# Patient Record
Sex: MALE | Race: WHITE | NOT HISPANIC OR LATINO | Employment: FULL TIME | URBAN - METROPOLITAN AREA
[De-identification: names, ages, dates, MRNs, and addresses within clinical notes are randomized per-mention and may not be internally consistent; named-entity substitution may affect disease eponyms.]

---

## 2019-05-02 ENCOUNTER — HOSPITAL ENCOUNTER (OUTPATIENT)
Dept: CT IMAGING | Facility: HOSPITAL | Age: 54
Discharge: HOME | End: 2019-05-02
Attending: INTERNAL MEDICINE

## 2019-05-02 ENCOUNTER — OFFICE VISIT - HEALTHEAST (OUTPATIENT)
Dept: INTERNAL MEDICINE | Facility: CLINIC | Age: 54
End: 2019-05-02

## 2019-05-02 DIAGNOSIS — I65.23 BILATERAL CAROTID ARTERY STENOSIS: ICD-10-CM

## 2019-05-02 DIAGNOSIS — R91.8 PULMONARY NODULES: ICD-10-CM

## 2019-05-02 DIAGNOSIS — G43.109 MIGRAINE WITH AURA AND WITHOUT STATUS MIGRAINOSUS, NOT INTRACTABLE: ICD-10-CM

## 2019-05-02 DIAGNOSIS — G45.9 TIA (TRANSIENT ISCHEMIC ATTACK): ICD-10-CM

## 2019-05-02 DIAGNOSIS — R29.818 SUSPECTED SLEEP APNEA: ICD-10-CM

## 2019-05-02 DIAGNOSIS — R04.2 HEMOPTYSIS: ICD-10-CM

## 2019-05-02 LAB
CHOLEST SERPL-MCNC: 306 MG/DL
ERYTHROCYTE [SEDIMENTATION RATE] IN BLOOD BY WESTERGREN METHOD: 4 MM/HR (ref 0–15)
FASTING STATUS PATIENT QL REPORTED: YES
HDLC SERPL-MCNC: 58 MG/DL
LDLC SERPL CALC-MCNC: 222 MG/DL
TRIGL SERPL-MCNC: 128 MG/DL
TSH SERPL DL<=0.005 MIU/L-ACNC: 0.83 UIU/ML (ref 0.3–5)

## 2019-05-02 ASSESSMENT — MIFFLIN-ST. JEOR: SCORE: 1852.77

## 2019-05-16 ENCOUNTER — HOSPITAL ENCOUNTER (OUTPATIENT)
Dept: CARDIOLOGY | Facility: CLINIC | Age: 54
Discharge: HOME | End: 2019-05-16
Attending: INTERNAL MEDICINE

## 2019-05-16 DIAGNOSIS — G45.9 TIA (TRANSIENT ISCHEMIC ATTACK): ICD-10-CM

## 2019-05-16 LAB
AORTIC ROOT: 3.1 CM
ASCENDING AORTA: 3.1 CM
BSA FOR ECHO PROCEDURE: 2.23 M2
CV BLOOD PRESSURE: ABNORMAL MMHG
CV ECHO HEIGHT: 72 IN
CV ECHO WEIGHT: 216 LBS
DOP CALC LVOT AREA: 3.8 CM2
DOP CALC LVOT DIAMETER: 2.2 CM
DOP CALC LVOT PEAK VEL: 97.5 CM/S
DOP CALC LVOT STROKE VOLUME: 88.5 CM3
DOP CALCLVOT PEAK VEL VTI: 23.3 CM
EJECTION FRACTION: 64 % (ref 55–75)
FRACTIONAL SHORTENING: 45.1 % (ref 28–44)
INTERVENTRICULAR SEPTUM IN END DIASTOLE: 1.22 CM (ref 0.6–1)
IVS/PW RATIO: 0.9
LA AREA 1: 18.2 CM2
LA AREA 2: 15.5 CM2
LEFT ATRIUM LENGTH: 5.1 CM
LEFT ATRIUM SIZE: 3.8 CM
LEFT ATRIUM TO AORTIC ROOT RATIO: 1.23 NO UNITS
LEFT ATRIUM VOLUME INDEX: 21.1 ML/M2
LEFT ATRIUM VOLUME: 47 ML
LEFT VENTRICLE CARDIAC INDEX: 2.3 L/MIN/M2
LEFT VENTRICLE CARDIAC OUTPUT: 5.1 L/MIN
LEFT VENTRICLE DIASTOLIC VOLUME INDEX: 32.2 CM3/M2 (ref 34–74)
LEFT VENTRICLE DIASTOLIC VOLUME: 71.7 CM3 (ref 62–150)
LEFT VENTRICLE HEART RATE: 58 BPM
LEFT VENTRICLE MASS INDEX: 101.9 G/M2
LEFT VENTRICLE SYSTOLIC VOLUME INDEX: 11.6 CM3/M2 (ref 11–31)
LEFT VENTRICLE SYSTOLIC VOLUME: 25.8 CM3 (ref 21–61)
LEFT VENTRICULAR INTERNAL DIMENSION IN DIASTOLE: 4.68 CM (ref 4.2–5.8)
LEFT VENTRICULAR INTERNAL DIMENSION IN SYSTOLE: 2.57 CM (ref 2.5–4)
LEFT VENTRICULAR MASS: 227.2 G
LEFT VENTRICULAR OUTFLOW TRACT MEAN GRADIENT: 2 MMHG
LEFT VENTRICULAR OUTFLOW TRACT MEAN VELOCITY: 75.8 CM/S
LEFT VENTRICULAR OUTFLOW TRACT PEAK GRADIENT: 4 MMHG
LEFT VENTRICULAR POSTERIOR WALL IN END DIASTOLE: 1.31 CM (ref 0.6–1)
LV STROKE VOLUME INDEX: 39.7 ML/M2
MITRAL VALVE E/A RATIO: 1.2
MV AVERAGE E/E' RATIO: 9.2 CM/S
MV DECELERATION TIME: 218 MS
MV E'TISSUE VEL-LAT: 6.67 CM/S
MV E'TISSUE VEL-MED: 7.25 CM/S
MV LATERAL E/E' RATIO: 9.6
MV MEDIAL E/E' RATIO: 8.8
MV PEAK A VELOCITY: 52.9 CM/S
MV PEAK E VELOCITY: 64 CM/S
NUC REST DIASTOLIC VOLUME INDEX: 3456 LBS
NUC REST SYSTOLIC VOLUME INDEX: 72 IN
RIGHT VENTRICULAR INTERNAL DIMENSION IN DYSTOLE: 3.14 CM
TRICUSPID VALVE ANULAR PLANE SYSTOLIC EXCURSION: 2.6 CM

## 2019-05-16 ASSESSMENT — MIFFLIN-ST. JEOR: SCORE: 1852.77

## 2019-05-30 ENCOUNTER — HOSPITAL ENCOUNTER (OUTPATIENT)
Dept: CARDIOLOGY | Facility: CLINIC | Age: 54
Discharge: HOME | End: 2019-05-30
Attending: INTERNAL MEDICINE

## 2019-05-30 DIAGNOSIS — G45.9 TIA (TRANSIENT ISCHEMIC ATTACK): ICD-10-CM

## 2019-07-18 ENCOUNTER — RECORDS - HEALTHEAST (OUTPATIENT)
Dept: ADMINISTRATIVE | Facility: OTHER | Age: 54
End: 2019-07-18

## 2019-08-30 ENCOUNTER — RECORDS - HEALTHEAST (OUTPATIENT)
Dept: ADMINISTRATIVE | Facility: OTHER | Age: 54
End: 2019-08-30

## 2019-09-19 ENCOUNTER — RECORDS - HEALTHEAST (OUTPATIENT)
Dept: ADMINISTRATIVE | Facility: OTHER | Age: 54
End: 2019-09-19

## 2020-01-14 ENCOUNTER — RECORDS - HEALTHEAST (OUTPATIENT)
Dept: ADMINISTRATIVE | Facility: OTHER | Age: 55
End: 2020-01-14

## 2020-02-15 ENCOUNTER — COMMUNICATION - HEALTHEAST (OUTPATIENT)
Dept: INTERNAL MEDICINE | Facility: CLINIC | Age: 55
End: 2020-02-15

## 2020-05-04 ENCOUNTER — COMMUNICATION - HEALTHEAST (OUTPATIENT)
Dept: INTERNAL MEDICINE | Facility: CLINIC | Age: 55
End: 2020-05-04

## 2020-05-04 DIAGNOSIS — G45.9 TIA (TRANSIENT ISCHEMIC ATTACK): ICD-10-CM

## 2021-03-01 ENCOUNTER — OFFICE VISIT - HEALTHEAST (OUTPATIENT)
Dept: INTERNAL MEDICINE | Facility: CLINIC | Age: 56
End: 2021-03-01

## 2021-03-01 DIAGNOSIS — K80.20 GALLSTONES: ICD-10-CM

## 2021-03-01 DIAGNOSIS — G45.9 TIA (TRANSIENT ISCHEMIC ATTACK): ICD-10-CM

## 2021-03-01 DIAGNOSIS — E78.00 PURE HYPERCHOLESTEROLEMIA: ICD-10-CM

## 2021-03-01 DIAGNOSIS — Z80.0 FAMILY HISTORY OF COLON CANCER: ICD-10-CM

## 2021-03-01 DIAGNOSIS — Z13.1 SCREENING FOR DIABETES MELLITUS: ICD-10-CM

## 2021-03-01 DIAGNOSIS — Z00.00 ROUTINE GENERAL MEDICAL EXAMINATION AT A HEALTH CARE FACILITY: ICD-10-CM

## 2021-03-01 DIAGNOSIS — Z12.5 SCREENING FOR PROSTATE CANCER: ICD-10-CM

## 2021-03-01 DIAGNOSIS — R91.8 PULMONARY NODULES: ICD-10-CM

## 2021-03-01 DIAGNOSIS — K59.01 SLOW TRANSIT CONSTIPATION: ICD-10-CM

## 2021-03-01 DIAGNOSIS — Z86.0100 HISTORY OF COLONIC POLYPS: ICD-10-CM

## 2021-03-01 LAB
ALBUMIN SERPL-MCNC: 3.4 G/DL (ref 3.5–5)
ALP SERPL-CCNC: 91 U/L (ref 45–120)
ALT SERPL W P-5'-P-CCNC: 82 U/L (ref 0–45)
ANION GAP SERPL CALCULATED.3IONS-SCNC: 8 MMOL/L (ref 5–18)
AST SERPL W P-5'-P-CCNC: 53 U/L (ref 0–40)
BILIRUB SERPL-MCNC: 0.8 MG/DL (ref 0–1)
BUN SERPL-MCNC: 14 MG/DL (ref 8–22)
CALCIUM SERPL-MCNC: 9.1 MG/DL (ref 8.5–10.5)
CHLORIDE BLD-SCNC: 108 MMOL/L (ref 98–107)
CHOLEST SERPL-MCNC: 179 MG/DL
CO2 SERPL-SCNC: 28 MMOL/L (ref 22–31)
CREAT SERPL-MCNC: 1.22 MG/DL (ref 0.7–1.3)
ERYTHROCYTE [DISTWIDTH] IN BLOOD BY AUTOMATED COUNT: 13.5 % (ref 11–14.5)
FASTING STATUS PATIENT QL REPORTED: YES
GFR SERPL CREATININE-BSD FRML MDRD: >60 ML/MIN/1.73M2
GLUCOSE BLD-MCNC: 93 MG/DL (ref 70–125)
HBA1C MFR BLD: 5.4 %
HCT VFR BLD AUTO: 49.3 % (ref 40–54)
HDLC SERPL-MCNC: 53 MG/DL
HGB BLD-MCNC: 16.6 G/DL (ref 14–18)
LDLC SERPL CALC-MCNC: 99 MG/DL
MCH RBC QN AUTO: 29.5 PG (ref 27–34)
MCHC RBC AUTO-ENTMCNC: 33.7 G/DL (ref 32–36)
MCV RBC AUTO: 88 FL (ref 80–100)
PLATELET # BLD AUTO: 208 THOU/UL (ref 140–440)
PMV BLD AUTO: 11.8 FL (ref 7–10)
POTASSIUM BLD-SCNC: 4.4 MMOL/L (ref 3.5–5)
PROT SERPL-MCNC: 6.6 G/DL (ref 6–8)
PSA SERPL-MCNC: 1.1 NG/ML (ref 0–3.5)
RBC # BLD AUTO: 5.63 MILL/UL (ref 4.4–6.2)
SODIUM SERPL-SCNC: 144 MMOL/L (ref 136–145)
TRIGL SERPL-MCNC: 135 MG/DL
TSH SERPL DL<=0.005 MIU/L-ACNC: 1.03 UIU/ML (ref 0.3–5)
WBC: 7.2 THOU/UL (ref 4–11)

## 2021-03-01 RX ORDER — ROSUVASTATIN CALCIUM 20 MG/1
TABLET, COATED ORAL
Qty: 90 TABLET | Refills: 3 | Status: SHIPPED | OUTPATIENT
Start: 2021-03-01 | End: 2021-08-03

## 2021-03-01 ASSESSMENT — MIFFLIN-ST. JEOR: SCORE: 1880.84

## 2021-03-02 ENCOUNTER — COMMUNICATION - HEALTHEAST (OUTPATIENT)
Dept: INTERNAL MEDICINE | Facility: CLINIC | Age: 56
End: 2021-03-02

## 2021-03-02 DIAGNOSIS — R74.01 TRANSAMINITIS: ICD-10-CM

## 2021-03-25 ENCOUNTER — HOSPITAL ENCOUNTER (OUTPATIENT)
Dept: CT IMAGING | Facility: CLINIC | Age: 56
Discharge: HOME | End: 2021-03-25
Attending: INTERNAL MEDICINE

## 2021-03-25 DIAGNOSIS — R91.8 PULMONARY NODULES: ICD-10-CM

## 2021-05-28 NOTE — PROGRESS NOTES
Office Visit - New Patient   Jason Romano   53 y.o.  male    Date of visit: 5/2/2019  Physician: Anjum Jhon MD     Assessment and Plan   1. TIA (transient ischemic attack)  Probable TIA versus complex migraine.  Story is interesting and raises concern for paradoxical embolus.  Given his hemoptysis and mild dyspnea we will obtain a CT PE study.  We will also obtain an echocardiogram with bubble study.  His history of snoring and suspected sleep apnea raises concern for atrial fibrillation will conduct an event monitor.  He will continue with aspirin and resume rosuvastatin.  I have asked him to see neurology as well as sleep medicine.  - Echo With Bubble Study; Future  - AMAIRANI Hook-Up; Future  - Ambulatory referral to Neurology  - Lipid Cascade FASTING  - Erythrocyte Sedimentation Rate  - rosuvastatin (CRESTOR) 20 MG tablet; Take 1 tablet (20 mg total) by mouth at bedtime.  Dispense: 90 tablet; Refill: 3  - Thyroid [place]    2. Suspected sleep apnea  - Ambulatory referral to Sleep Medicine    3. Hemoptysis  - CTA Chest PE Run; Future    4. Pulmonary nodules  Very small in 2015.  Repeating imaging as above    5. Migraine with aura and without status migrainosus, not intractable  As above    Return in about 1 month (around 5/30/2019) for recheck.     Chief Complaint   Chief Complaint   Patient presents with     [facility]     Hospital Visit Follow Up        Patient Profile   Social History     Social History Narrative    Lives with his wife, Lauren.  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          Past Medical History   Patient Active Problem List   Diagnosis     Hyperlipidemia       Past Surgical History  He has a past surgical history that includes Vasectomy.     History of Present Illness   This 53 y.o. old man comes in to [facility] and for post emergency room follow-up.  His medical history was reviewed, electronic medical records obtained reflectance note.  Overall he is quite  healthy.  He has a history of migraine headaches, mainly ocular and occurring with less frequency.  He also has a history of hyperlipidemia and just before his 50th birthday had a CT calcium score which was 0.  At that time he was on a statin but this was discontinued because of his low calcium score.  He did have a calcified granuloma in his right lower lobe and a noncalcified 3 mm nodule in his left lower lobe.  He is a never smoker.  On 4/14/2019 he was at home when he sneezed.  He immediately had onset of right arm weakness.  He then coughed and had a small amount of hemoptysis.  He notified his wife and he came to the emergency room at [facility].  While speaking with the  he had word finding difficulty.  He had a CTA of the head and neck which showed some carotid artery disease but no evidence of stroke.  He then underwent an MRI MRA of the head and neck which again showed carotid artery disease but no evidence of acute stroke.  Symptoms resolved and it was felt that he likely had a complex migraine headache.  He did have an EKG which showed sinus rhythm with a right bundle branch block and left axis deviation.  He has had no recurrence of symptoms.  He did restart taking aspirin.  He has not restarted a statin.  He does snore and suspects he has sleep apnea.  His father is atrial fibrillation.  He has no history of blood clots.  His mother did have a DVT when she was younger.  He felt mildly short of breath with exertion but is felt that this was due to deconditioning.  He has not really had any pleuritic chest pain.  He has had no leg swelling.  No recent travel.    Review of Systems: A comprehensive review of systems was negative except as noted.     Medications and Allergies   Current Outpatient Medications   Medication Sig Dispense Refill     acetaminophen (TYLENOL) 500 MG tablet Take 1,000 mg by mouth every 6 (six) hours as needed for pain.       ascorbic acid, vitamin C, (VITAMIN C)  1000 MG tablet Take 1,000 mg by mouth daily.       aspirin 81 MG EC tablet Take 81 mg by mouth daily.       cholecalciferol, vitamin D3, 1,000 unit tablet Take 4,000 Units by mouth daily.       naproxen sodium (ALEVE) 220 MG tablet Take 220 mg by mouth 2 (two) times a day as needed.              rosuvastatin (CRESTOR) 20 MG tablet Take 1 tablet (20 mg total) by mouth at bedtime. 90 tablet 3     No current facility-administered medications for this visit.      Allergies   Allergen Reactions     Atorvastatin      Muscle aches        Family and Social History   Family History   Problem Relation Age of Onset     Deep vein thrombosis Mother      Atrial fibrillation Father      Pacemaker Father      Colon cancer Father      Melanoma Father      No Medical Problems Sister      No Medical Problems Son      No Medical Problems Son      No Medical Problems Son      No Medical Problems Daughter      No Medical Problems Daughter         Social History     Tobacco Use     Smoking status: Never Smoker     Smokeless tobacco: Never Used   Substance Use Topics     Alcohol use: Yes     Comment: rare     Drug use: Never        Physical Exam   General Appearance:   No acute distress    /82 (Patient Site: Right Arm, Patient Position: Sitting, Cuff Size: Adult Regular)   Pulse 71   Ht 6' (1.829 m)   Wt 216 lb (98 kg)   SpO2 98%   BMI 29.29 kg/m      EYES: Eyelids, conjunctiva, and sclera were normal. Pupils were normal. Cornea, iris, and lens were normal bilaterally.  HEAD, EARS, NOSE, MOUTH, AND THROAT: Head and face were normal. Hearing was normal to voice and the ears were normal to external exam. Nose appearance was normal and there was no discharge. Oropharynx was normal.  NECK: Neck appearance was normal. There were no neck masses and the thyroid was not enlarged.  RESPIRATORY: Breathing pattern was normal and the chest moved symmetrically.  Percussion/auscultatory percussion was normal.  Lung sounds were normal and  there were no abnormal sounds.  CARDIOVASCULAR: Heart rate and rhythm were normal.  S1 and S2 were normal and there were no extra sounds or murmurs. Peripheral pulses in arms and legs were normal.  Jugular venous pressure was normal.  There was no peripheral edema.  GASTROINTESTINAL: The abdomen was normal in contour.  Bowel sounds were present.  Percussion detected no organ enlargement or tenderness.  Palpation detected no tenderness, mass, or enlarged organs.   MUSCULOSKELETAL: Skeletal configuration was normal and muscle mass was normal for age. Joint appearance was overall normal.  LYMPHATIC: There were no enlarged nodes.  SKIN/HAIR/NAILS: Skin color was normal.  There were no skin lesions.  Hair and nails were normal.  NEUROLOGIC: The patient was alert and oriented to person, place, time, and circumstance. Speech was normal. Cranial nerves were normal. Motor strength was normal for age. The patient was normally coordinated.  PSYCHIATRIC:  Mood and affect were normal and the patient had normal recent and remote memory. The patient's judgment and insight were normal.       Additional Information   Review and/or order of clinical lab tests: Reviewed and ordered  Review and/or order of radiology tests: Reviewed and ordered  Review and/or order of medicine tests: Reviewed and ordered  Discussion of test results with performing physician:  Decision to obtain old records and/or obtain history from someone other than the patient:  Review and summarization of old records and/or obtaining history from someone other than the patient and.or discussion of case with another health care provider: I reviewed his emergency room visit summarized above  Independent visualization of image, tracing or specimen itself: I personally reviewed his EKG, summarized above    Time:      Anjum John MD  Internal Medicine  Contact me at [phone]

## 2021-06-02 VITALS — WEIGHT: 216 LBS | HEIGHT: 72 IN | BODY MASS INDEX: 29.26 KG/M2

## 2021-06-02 VITALS — BODY MASS INDEX: 29.26 KG/M2 | HEIGHT: 72 IN | WEIGHT: 216 LBS

## 2021-06-05 VITALS
HEART RATE: 78 BPM | DIASTOLIC BLOOD PRESSURE: 76 MMHG | BODY MASS INDEX: 30.09 KG/M2 | SYSTOLIC BLOOD PRESSURE: 116 MMHG | HEIGHT: 72 IN | OXYGEN SATURATION: 97 % | WEIGHT: 222.19 LBS

## 2021-06-06 NOTE — TELEPHONE ENCOUNTER
Isolated elbow pain would be very unusual side effect of rosuvastatin.  This is frequently related to a bursitis such as tennis elbow or golfer's elbow.  Arthritis of the elbow could cause similar symptoms.  To be sure it is not from rosuvastatin, you could stop the medication for 1 month.  Monitor the pain over the month and if it goes away then resume the medication and see if the pain comes back.  We can do all blood work at the time of your physical.

## 2021-06-07 NOTE — TELEPHONE ENCOUNTER
RN cannot approve Refill Request    RN can NOT refill this medication Protocol failed and NO refill given.     Anna Russell, [facility] Triage/Med Refill 5/5/2020    Requested Prescriptions   Pending Prescriptions Disp Refills     rosuvastatin (CRESTOR) 20 MG tablet [Pharmacy Med Name: ROSUVASTATIN CALCIUM 20 MG TAB] 90 tablet 3     Sig: TAKE 1 TABLET BY MOUTH EVERYDAY AT BEDTIME       Statins Refill Protocol (Hmg CoA Reductase Inhibitors) Failed - 5/4/2020 12:27 AM        Failed - PCP or prescribing provider visit in past 12 months      Last office visit with prescriber/PCP: 5/2/2019 Anjum John MD OR same dept: Visit date not found OR same specialty: 5/2/2019 Anjum John MD  Last physical: Visit date not found Last MTM visit: Visit date not found   Next visit within 3 mo: Visit date not found  Next physical within 3 mo: Visit date not found  Prescriber OR PCP: Anjum John MD  Last diagnosis associated with med order: 1. TIA (transient ischemic attack)  - rosuvastatin (CRESTOR) 20 MG tablet [Pharmacy Med Name: ROSUVASTATIN CALCIUM 20 MG TAB]; TAKE 1 TABLET BY MOUTH EVERYDAY AT BEDTIME  Dispense: 90 tablet; Refill: 3    If protocol passes may refill for 12 months if within 3 months of last provider visit (or a total of 15 months).

## 2021-06-15 NOTE — PROGRESS NOTES
Office Visit - Physical   Jason Romano   55 y.o.  male    Date of visit: 3/1/2021  Physician: Anjum John MD     Assessment and Plan   1. Routine general medical examination at a health care facility  This is a generally healthy 55-year-old man with issues as discussed below.  He deferred on vaccinations given recent Covid vaccine and we can discuss again in the future.    2. TIA (transient ischemic attack)  Likely complex migraine although TIA is a consideration.  He is now on aspirin and statin and his neurological evaluation was unremarkable.  - rosuvastatin (CRESTOR) 20 MG tablet; TAKE 1 TABLET BY MOUTH EVERYDAY AT BEDTIME  Dispense: 90 tablet; Refill: 3    3. Pure hypercholesterolemia  Continue rosuvastatin which she is tolerating  - HM2(CBC w/o Differential)  - Lipid Cascade  - Comprehensive Metabolic Panel  - Thyroid Cascade    4. History of colonic polyps  5. Family history of colon cancer  Follow-up colonoscopy, 1 year given advanced polyp    6. Pulmonary nodules  - CT Chest Without Contrast; Future    7. Slow transit constipation  Discussed fiber and MiraLAX    8. Gallstones  Not appear symptomatic at this time    9. Screening for diabetes mellitus  - Glycosylated Hemoglobin A1c    10. Screening for prostate cancer  - PSA (Prostatic-Specific Antigen), Annual Screen    Return in about 1 year (around 3/1/2022) for annual physical.     Chief Complaint   Chief Complaint   Patient presents with     Annual Exam     fasting        Patient Profile   Social History     Social History Narrative    Lives with his wife, Lauren.  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          Past Medical History   Patient Active Problem List   Diagnosis     Pure hypercholesterolemia       Past Surgical History  He has a past surgical history that includes Vasectomy.     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  Overall doing quite well.  When I last saw him  he had recently had a neurological event.  We evaluated him for possible TIA and this was unremarkable.  He was having some pulmonary symptoms and small amount of hemoptysis and we did a CT PE study which looked okay with the exception of the pulmonary nodule which was new from a CT calcium scan in 2015.  His cholesterol is also quite elevated and despite a low CT calcium score of 0 we decided to start rosuvastatin.  He is also taking aspirin.  No further neurological events.  He does have a family history of colon cancer in his father and had an advanced polyp removed recently.  He is to go in for 1 year follow-up.  He has been having some issues with constipation which is long-term.  No significant upper GI symptoms.  Occasional reflux but no dyspepsia nausea or vomiting.  Had some gallstones on the CT scan but no postprandial right upper quadrant pain.    Review of Systems: A comprehensive review of systems was negative except as noted.     Medications and Allergies   Current Outpatient Medications   Medication Sig Dispense Refill     acetaminophen (TYLENOL) 500 MG tablet Take 1,000 mg by mouth every 6 (six) hours as needed for pain.       ascorbic acid, vitamin C, (VITAMIN C) 1000 MG tablet Take 1,000 mg by mouth daily.       aspirin 81 MG EC tablet Take 81 mg by mouth daily.       cholecalciferol, vitamin D3, 1,000 unit tablet Take 4,000 Units by mouth daily.       naproxen sodium (ALEVE) 220 MG tablet Take 220 mg by mouth 2 (two) times a day as needed.              rosuvastatin (CRESTOR) 20 MG tablet TAKE 1 TABLET BY MOUTH EVERYDAY AT BEDTIME 90 tablet 3     No current facility-administered medications for this visit.      Allergies   Allergen Reactions     Atorvastatin      Muscle aches        Family and Social History   Family History   Problem Relation Age of Onset     Deep vein thrombosis Mother      Atrial fibrillation Father      Pacemaker Father      Colon cancer Father 50     Melanoma Father       Hypertension Sister      No Medical Problems Son         Research     No Medical Problems Son         Undergrad [employer]     No Medical Problems Son         HS      No Medical Problems Daughter         NP      No Medical Problems Daughter         [employer] [place]        Social History     Tobacco Use     Smoking status: Never Smoker     Smokeless tobacco: Never Used   Substance Use Topics     Alcohol use: Yes     Comment: rare     Drug use: Never        Physical Exam   General Appearance:   No acute distress    /76 (Patient Site: Left Arm, Patient Position: Sitting, Cuff Size: Adult Regular)   Pulse 78   Ht 6' (1.829 m)   Wt (!) 222 lb 3 oz (100.8 kg)   SpO2 97%   BMI 30.13 kg/m      EYES: Eyelids, conjunctiva, and sclera were normal. Pupils were normal. Cornea, iris, and lens were normal bilaterally.  HEAD, EARS, NOSE, MOUTH, AND THROAT: Head and face were normal. Hearing was normal to voice and the ears were normal to external exam. Nose appearance was normal and there was no discharge. Oropharynx was normal.  NECK: Neck appearance was normal. There were no neck masses and the thyroid was not enlarged.  RESPIRATORY: Breathing pattern was normal and the chest moved symmetrically.  Percussion/auscultatory percussion was normal.  Lung sounds were normal and there were no abnormal sounds.  CARDIOVASCULAR: Heart rate and rhythm were normal.  S1 and S2 were normal and there were no extra sounds or murmurs. Peripheral pulses in arms and legs were normal.  Jugular venous pressure was normal.  There was no peripheral edema.  GASTROINTESTINAL: The abdomen was normal in contour.  Bowel sounds were present.  Percussion detected no organ enlargement or tenderness.  Palpation detected no tenderness, mass, or enlarged organs.   MUSCULOSKELETAL: Skeletal configuration was normal and muscle mass was normal for age. Joint appearance was overall normal.  LYMPHATIC: There were no enlarged nodes.  SKIN/HAIR/NAILS:  Skin color was normal.  There were no skin lesions.  Hair and nails were normal.  NEUROLOGIC: The patient was alert and oriented to person, place, time, and circumstance. Speech was normal. Cranial nerves were normal. Motor strength was normal for age. The patient was normally coordinated.  PSYCHIATRIC:  Mood and affect were normal and the patient had normal recent and remote memory. The patient's judgment and insight were normal.       Additional Information        Anjum John MD  Internal Medicine  Contact me at [phone]

## 2021-06-17 ENCOUNTER — AMBULATORY - HEALTHEAST (OUTPATIENT)
Dept: LAB | Facility: CLINIC | Age: 56
End: 2021-06-17

## 2021-06-17 DIAGNOSIS — R74.01 TRANSAMINITIS: ICD-10-CM

## 2021-06-17 LAB
ALBUMIN SERPL-MCNC: 3.4 G/DL (ref 3.5–5)
ALP SERPL-CCNC: 66 U/L (ref 45–120)
ALT SERPL W P-5'-P-CCNC: 37 U/L (ref 0–45)
ANION GAP SERPL CALCULATED.3IONS-SCNC: 8 MMOL/L (ref 5–18)
AST SERPL W P-5'-P-CCNC: 28 U/L (ref 0–40)
BILIRUB SERPL-MCNC: 1.1 MG/DL (ref 0–1)
BUN SERPL-MCNC: 19 MG/DL (ref 8–22)
CALCIUM SERPL-MCNC: 8.8 MG/DL (ref 8.5–10.5)
CHLORIDE BLD-SCNC: 110 MMOL/L (ref 98–107)
CO2 SERPL-SCNC: 25 MMOL/L (ref 22–31)
CREAT SERPL-MCNC: 1.19 MG/DL (ref 0.7–1.3)
GFR SERPL CREATININE-BSD FRML MDRD: >60 ML/MIN/1.73M2
GLUCOSE BLD-MCNC: 93 MG/DL (ref 70–125)
POTASSIUM BLD-SCNC: 3.8 MMOL/L (ref 3.5–5)
PROT SERPL-MCNC: 6.6 G/DL (ref 6–8)
SODIUM SERPL-SCNC: 143 MMOL/L (ref 136–145)

## 2021-06-26 ENCOUNTER — HEALTH MAINTENANCE LETTER (OUTPATIENT)
Age: 56
End: 2021-06-26

## 2021-08-03 DIAGNOSIS — G45.9 TIA (TRANSIENT ISCHEMIC ATTACK): ICD-10-CM

## 2021-08-03 RX ORDER — ROSUVASTATIN CALCIUM 20 MG/1
TABLET, COATED ORAL
Qty: 90 TABLET | Refills: 3 | Status: SHIPPED | OUTPATIENT
Start: 2021-08-03 | End: 2022-05-26

## 2021-10-16 ENCOUNTER — HEALTH MAINTENANCE LETTER (OUTPATIENT)
Age: 56
End: 2021-10-16

## 2022-05-22 ENCOUNTER — HEALTH MAINTENANCE LETTER (OUTPATIENT)
Age: 57
End: 2022-05-22

## 2022-05-23 ASSESSMENT — ENCOUNTER SYMPTOMS
ARTHRALGIAS: 0
SHORTNESS OF BREATH: 0
HEMATOCHEZIA: 0
FEVER: 0
NERVOUS/ANXIOUS: 0
SORE THROAT: 0
HEADACHES: 0
FREQUENCY: 0
DIZZINESS: 0
ABDOMINAL PAIN: 1
PALPITATIONS: 0
JOINT SWELLING: 0
CHILLS: 0
MYALGIAS: 0
NAUSEA: 0
CONSTIPATION: 1
DYSURIA: 0
EYE PAIN: 0
COUGH: 0
WEAKNESS: 0
DIARRHEA: 0
PARESTHESIAS: 0
HEARTBURN: 0
HEMATURIA: 0

## 2022-05-26 ENCOUNTER — OFFICE VISIT (OUTPATIENT)
Dept: INTERNAL MEDICINE | Facility: CLINIC | Age: 57
End: 2022-05-26
Payer: COMMERCIAL

## 2022-05-26 VITALS
HEART RATE: 70 BPM | HEIGHT: 72 IN | WEIGHT: 222.3 LBS | BODY MASS INDEX: 30.11 KG/M2 | OXYGEN SATURATION: 98 % | DIASTOLIC BLOOD PRESSURE: 96 MMHG | SYSTOLIC BLOOD PRESSURE: 154 MMHG

## 2022-05-26 DIAGNOSIS — E66.09 CLASS 1 OBESITY DUE TO EXCESS CALORIES WITHOUT SERIOUS COMORBIDITY WITH BODY MASS INDEX (BMI) OF 30.0 TO 30.9 IN ADULT: ICD-10-CM

## 2022-05-26 DIAGNOSIS — E66.811 CLASS 1 OBESITY DUE TO EXCESS CALORIES WITHOUT SERIOUS COMORBIDITY WITH BODY MASS INDEX (BMI) OF 30.0 TO 30.9 IN ADULT: ICD-10-CM

## 2022-05-26 DIAGNOSIS — R53.82 CHRONIC FATIGUE: ICD-10-CM

## 2022-05-26 DIAGNOSIS — Z13.1 SCREENING FOR DIABETES MELLITUS: ICD-10-CM

## 2022-05-26 DIAGNOSIS — I65.23 CAROTID ATHEROSCLEROSIS, BILATERAL: ICD-10-CM

## 2022-05-26 DIAGNOSIS — Z00.00 ROUTINE GENERAL MEDICAL EXAMINATION AT A HEALTH CARE FACILITY: Primary | ICD-10-CM

## 2022-05-26 DIAGNOSIS — Z80.0 FAMILY HISTORY OF COLON CANCER: ICD-10-CM

## 2022-05-26 DIAGNOSIS — R68.82 LOW LIBIDO: ICD-10-CM

## 2022-05-26 DIAGNOSIS — Z86.16 HISTORY OF COVID-19: ICD-10-CM

## 2022-05-26 DIAGNOSIS — E78.00 PURE HYPERCHOLESTEROLEMIA: ICD-10-CM

## 2022-05-26 DIAGNOSIS — K80.20 GALLSTONES: ICD-10-CM

## 2022-05-26 DIAGNOSIS — Z12.5 SCREENING PSA (PROSTATE SPECIFIC ANTIGEN): ICD-10-CM

## 2022-05-26 PROBLEM — K63.5 COLON POLYPS: Status: ACTIVE | Noted: 2022-05-26

## 2022-05-26 PROCEDURE — 99396 PREV VISIT EST AGE 40-64: CPT | Performed by: INTERNAL MEDICINE

## 2022-05-26 ASSESSMENT — ENCOUNTER SYMPTOMS
JOINT SWELLING: 0
CHILLS: 0
HEADACHES: 0
HEMATURIA: 0
FEVER: 0
NERVOUS/ANXIOUS: 0
CONSTIPATION: 1
HEMATOCHEZIA: 0
EYE PAIN: 0
WEAKNESS: 0
ARTHRALGIAS: 0
COUGH: 0
FREQUENCY: 0
PARESTHESIAS: 0
DIZZINESS: 0
HEARTBURN: 0
ABDOMINAL PAIN: 1
PALPITATIONS: 0
DIARRHEA: 0
NAUSEA: 0
DYSURIA: 0
MYALGIAS: 0
SHORTNESS OF BREATH: 0
SORE THROAT: 0

## 2022-05-26 NOTE — PROGRESS NOTES
SUBJECTIVE:   CC: Jason Romano is an 56 year old male who presents for preventative health visit.       Patient has been advised of split billing requirements and indicates understanding: Yes  Healthy Habits:     Getting at least 3 servings of Calcium per day:  Yes    Bi-annual eye exam:  Yes    Dental care twice a year:  NO    Sleep apnea or symptoms of sleep apnea:  Sleep apnea    Diet:  Regular (no restrictions)    Frequency of exercise:  1 day/week    Duration of exercise:  Less than 15 minutes    Taking medications regularly:  Yes    Medication side effects:  Muscle aches    PHQ-2 Total Score: 0    Additional concerns today:  No    Today's PHQ-2 Score:   PHQ-2 ( 1999 Pfizer) 5/23/2022   Q1: Little interest or pleasure in doing things 0   Q2: Feeling down, depressed or hopeless 0   PHQ-2 Score 0   Q1: Little interest or pleasure in doing things Not at all   Q2: Feeling down, depressed or hopeless Not at all   PHQ-2 Score 0       Abuse: Current or Past(Physical, Sexual or Emotional)- No  Do you feel safe in your environment? Yes    Have you ever done Advance Care Planning? (For example, a Health Directive, POLST, or a discussion with a medical provider or your loved ones about your wishes): No, advance care planning information given to patient to review.  Patient plans to discuss their wishes with loved ones or provider.      Social History     Tobacco Use     Smoking status: Never Smoker     Smokeless tobacco: Never Used   Substance Use Topics     Alcohol use: Yes     Comment: Alcoholic Drinks/day: rare     If you drink alcohol do you typically have >3 drinks per day or >7 drinks per week? No    Alcohol Use 5/26/2022   Prescreen: >3 drinks/day or >7 drinks/week? -   Prescreen: >3 drinks/day or >7 drinks/week? No       Last PSA  Prostate Specific Antigen Screen   Date Value Ref Range Status   03/01/2021 1.1 0.0 - 3.5 ng/mL Final       Reviewed orders with patient. Reviewed health maintenance and updated orders  accordingly - Yes      Reviewed and updated as needed this visit by clinical staff   Tobacco  Allergies  Meds                Reviewed and updated as needed this visit by Provider     Meds                 Review of Systems   Constitutional: Negative for chills and fever.   HENT: Negative for congestion, ear pain, hearing loss and sore throat.    Eyes: Negative for pain and visual disturbance.   Respiratory: Negative for cough and shortness of breath.    Cardiovascular: Negative for chest pain, palpitations and peripheral edema.   Gastrointestinal: Positive for abdominal pain and constipation. Negative for diarrhea, heartburn, hematochezia and nausea.   Genitourinary: Negative for dysuria, frequency, genital sores, hematuria, impotence, penile discharge and urgency.   Musculoskeletal: Negative for arthralgias, joint swelling and myalgias.   Skin: Negative for rash.   Neurological: Negative for dizziness, weakness, headaches and paresthesias.   Psychiatric/Behavioral: Negative for mood changes. The patient is not nervous/anxious.        OBJECTIVE:   BP (!) 146/93   Pulse 70   Ht 1.829 m (6')   Wt 100.8 kg (222 lb 4.8 oz)   SpO2 98%   BMI 30.15 kg/m      Physical Exam      General: Alert, in no distress  Skin: No significant lesion seen.  Eyes/nose/throat: Eyes without scleral icterus, eye movements normal, pupils equal and reactive, oropharynx clear, ears with normal TM's  MSK: Neck with good ROM  Lymphatic: Neck without adenopathy or masses  Endocrine: Thyroid with no nodules to palpation  Pulm: Lungs clear to auscultation bilaterally  Cardiac: Heart with regular rate and rhythm, no murmur or gallop  No carotid bruit heard  GI: Abdomen soft, nontender. No palpable enlargement of liver or spleen  MSK: Extremities no tenderness or edema  Neuro: Moves all extremities, without focal weakness  Psych: Alert, normal mental status. Normal affect and speech      ASSESSMENT/PLAN:     Annual preventive exam for   Juan Antonio, who continues in his busy practice at [facility], and has generally been feeling well.  He knows he needs to lose some weight, and will be ramping up exercise.    He will come in for fasting blood test on future morning, at which time we will check his lipid panel, comprehensive metabolic panel, CBC, TSH, screening PSA, and also morning testosterone level and vitamin D level.    He has received 2 doses of COVID-19 vaccine and also recovered from a breakthrough infection.    Issues are as follows:    Pure hypercholesterolemia, with troublesome side effects from atorvastatin and rosuvastatin, no significant coronary calcifications, but does have carotid atherosclerosis (detailed below)  Initially atorvastatin, myalgias. Switched to rosuvastatin around 2010  Was on rosuvastatin 20 mg a day, on and off for most of [id number], dose reduced to 10 mg March 2021 until stopping early May 2022  Because of abdominal discomforts  Untreated  (5-2-2019)    3-1-2021 while on rosuvastatin 20 mg  LDL Cholesterol Calculated <=129 mg/dL      3-  CORONARY ARTERY CALCIFICATION: Nothing significant.    Blood pressure elevation recorded today May 20, 2022, will recheck, and also asked him to track his blood pressure outside the clinic.  Target 120/80  BP Readings from Last 6 Encounters:   05/26/22 (!) 146/93   03/01/21 116/76     History of TIA (transient ischemic attack) vs complex migraine, April 2019, there has been no recurrence  Spell of right upper extremity weakness  Likely complex migraine although TIA was a consideration  Continues on a baby aspirin daily    AST and ALT elevation, which subsequently normalized  Alcohol weekend a few beers  3-1-2021  AST 0 - 40 U/L 53 High       ALT 0 - 45 U/L 82 High      6-  AST 0 - 40 U/L 28      ALT 0 - 45 U/L 37      Carotid atherosclerotic plaque, but no history of cerebral ischemia  4-  RIGHT CAROTID: Mild atherosclerotic type luminal irregularity  at the right carotid bifurcation and within the right carotid bulb with less than 30% stenosis in the proximal right ICA based on NASCET criteria.  LEFT CAROTID: Mild irregularity of the proximal-mid left ICA segment with at most 30-40% stenosis in the left ICA based on NASCET criteria. No findings to suggest acute dissection.  IMPRESSION:  HEAD MRI:   1.  No mass, hemorrhage, or recent infarct identified.  HEAD MRA:   1.  No aneurysm, high flow AVM or significant stenosis identified.  NECK MRA:  1.  Mild atherosclerotic change at the carotid bifurcations and within the proximal-mid left ICA segment. There is 30-40% stenosis of the left ICA and less than 30% stenosis of the proximal right ICA by NASCET criteria.  2.  No evidence for acute dissection.    Overweight, BMI 30  Wt Readings from Last 5 Encounters:   05/26/22 100.8 kg (222 lb 4.8 oz)   03/01/21 100.8 kg (222 lb 3 oz)   05/02/19 98 kg (216 lb)   03/24/16 100 kg (220 lb 6.4 oz)   04/03/15 97.3 kg (214 lb 8 oz)     Positional sleep apnea  He is already undergone a sleep study, but does not need to use CPAP, and manages with sleeping position    History of advanced colonic polyps, next colonoscopy would be due December 2022  Family history of colon cancer (father)  Recent colonoscopy was December 2021, 2 polyps, small less than 10 mm  Colonoscopy before that was December 2020, which revealed 1 large adenomatous polyp  Dr. 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  Prior right lower lobe nodular focus no longer seen. Few stable incidental pulmonary lymph nodes up to 5 mm requiring no routine follow-up. Incidental old granulomatous change and calcifications. Regions of minimal bronchiectasis    Asymptomatic  gallstones  CT scan March 25, 2021  UPPER ABDOMEN: Cholelithiasis.    Slow transit constipation  Managed with fiber and MiraLAX  He believes rosuvastatin may have been a contributor as well    Prostate Specific Antigen Screen   Date Value Ref Range Status   03/01/2021 1.1 0.0 - 3.5 ng/mL Final     History of a mild case of COVID-19 in April 2021  Immunization History   Administered Date(s) Administered     COVID-19,PF,Pfizer (12+ Yrs) 12/30/2020, 01/19/2021     Td,adult,historic,unspecified 02/05/2003     Tdap (Adacel,Boostrix) 10/17/2013         COUNSELING:   Reviewed preventive health counseling, as reflected in patient instructions       Healthy diet/nutrition    Estimated body mass index is 30.15 kg/m  as calculated from the following:    Height as of this encounter: 1.829 m (6').    Weight as of this encounter: 100.8 kg (222 lb 4.8 oz).     Weight management plan: Discussed healthy diet and exercise guidelines    He reports that he has never smoked. He has never used smokeless tobacco.        RANDA BROOKE MD  [facility]

## 2022-06-03 ENCOUNTER — LAB (OUTPATIENT)
Dept: LAB | Facility: CLINIC | Age: 57
End: 2022-06-03
Payer: COMMERCIAL

## 2022-06-03 DIAGNOSIS — R68.82 LOW LIBIDO: ICD-10-CM

## 2022-06-03 DIAGNOSIS — Z13.1 SCREENING FOR DIABETES MELLITUS: ICD-10-CM

## 2022-06-03 DIAGNOSIS — Z12.5 SCREENING PSA (PROSTATE SPECIFIC ANTIGEN): ICD-10-CM

## 2022-06-03 DIAGNOSIS — R53.82 CHRONIC FATIGUE: ICD-10-CM

## 2022-06-03 DIAGNOSIS — E78.00 PURE HYPERCHOLESTEROLEMIA: ICD-10-CM

## 2022-06-03 DIAGNOSIS — Z00.00 ROUTINE GENERAL MEDICAL EXAMINATION AT A HEALTH CARE FACILITY: ICD-10-CM

## 2022-06-03 LAB
ALBUMIN SERPL-MCNC: 2.9 G/DL (ref 3.5–5)
ALP SERPL-CCNC: 74 U/L (ref 45–120)
ALT SERPL W P-5'-P-CCNC: 53 U/L (ref 0–45)
ANION GAP SERPL CALCULATED.3IONS-SCNC: 9 MMOL/L (ref 5–18)
AST SERPL W P-5'-P-CCNC: 30 U/L (ref 0–40)
BILIRUB SERPL-MCNC: 0.6 MG/DL (ref 0–1)
BUN SERPL-MCNC: 17 MG/DL (ref 8–22)
CALCIUM SERPL-MCNC: 9.2 MG/DL (ref 8.5–10.5)
CHLORIDE BLD-SCNC: 107 MMOL/L (ref 98–107)
CHOLEST SERPL-MCNC: 322 MG/DL
CO2 SERPL-SCNC: 27 MMOL/L (ref 22–31)
CREAT SERPL-MCNC: 1.13 MG/DL (ref 0.7–1.3)
DEPRECATED CALCIDIOL+CALCIFEROL SERPL-MC: 68 UG/L (ref 20–75)
ERYTHROCYTE [DISTWIDTH] IN BLOOD BY AUTOMATED COUNT: 13.4 % (ref 10–15)
FASTING STATUS PATIENT QL REPORTED: YES
GFR SERPL CREATININE-BSD FRML MDRD: 76 ML/MIN/1.73M2
GLUCOSE BLD-MCNC: 107 MG/DL (ref 70–125)
HBA1C MFR BLD: 5.4 % (ref 0–5.6)
HCT VFR BLD AUTO: 47 % (ref 40–53)
HDLC SERPL-MCNC: 53 MG/DL
HGB BLD-MCNC: 16.2 G/DL (ref 13.3–17.7)
LDLC SERPL CALC-MCNC: 236 MG/DL
MCH RBC QN AUTO: 30.5 PG (ref 26.5–33)
MCHC RBC AUTO-ENTMCNC: 34.5 G/DL (ref 31.5–36.5)
MCV RBC AUTO: 89 FL (ref 78–100)
PLATELET # BLD AUTO: 222 10E3/UL (ref 150–450)
POTASSIUM BLD-SCNC: 4.6 MMOL/L (ref 3.5–5)
PROT SERPL-MCNC: 6.2 G/DL (ref 6–8)
RBC # BLD AUTO: 5.31 10E6/UL (ref 4.4–5.9)
SHBG SERPL-SCNC: 46 NMOL/L (ref 11–80)
SODIUM SERPL-SCNC: 143 MMOL/L (ref 136–145)
TRIGL SERPL-MCNC: 164 MG/DL
TSH SERPL DL<=0.005 MIU/L-ACNC: 1.51 UIU/ML (ref 0.3–5)
WBC # BLD AUTO: 6.6 10E3/UL (ref 4–11)

## 2022-06-03 PROCEDURE — 84443 ASSAY THYROID STIM HORMONE: CPT

## 2022-06-03 PROCEDURE — 36415 COLL VENOUS BLD VENIPUNCTURE: CPT

## 2022-06-03 PROCEDURE — 80061 LIPID PANEL: CPT

## 2022-06-03 PROCEDURE — 80053 COMPREHEN METABOLIC PANEL: CPT

## 2022-06-03 PROCEDURE — 85027 COMPLETE CBC AUTOMATED: CPT

## 2022-06-03 PROCEDURE — G0103 PSA SCREENING: HCPCS

## 2022-06-03 PROCEDURE — 83036 HEMOGLOBIN GLYCOSYLATED A1C: CPT

## 2022-06-03 PROCEDURE — 84270 ASSAY OF SEX HORMONE GLOBUL: CPT

## 2022-06-03 PROCEDURE — 82306 VITAMIN D 25 HYDROXY: CPT

## 2022-06-03 PROCEDURE — 84403 ASSAY OF TOTAL TESTOSTERONE: CPT

## 2022-06-04 ENCOUNTER — MYC MEDICAL ADVICE (OUTPATIENT)
Dept: INTERNAL MEDICINE | Facility: CLINIC | Age: 57
End: 2022-06-04
Payer: COMMERCIAL

## 2022-06-04 DIAGNOSIS — E78.00 PURE HYPERCHOLESTEROLEMIA: Primary | ICD-10-CM

## 2022-06-06 LAB — PSA SERPL-MCNC: 1.05 UG/L (ref 0–3.5)

## 2022-06-06 RX ORDER — ROSUVASTATIN CALCIUM 10 MG/1
10 TABLET, COATED ORAL DAILY
COMMUNITY

## 2022-06-07 LAB
TESTOST FREE SERPL-MCNC: 9.15 NG/DL
TESTOST SERPL-MCNC: 534 NG/DL (ref 240–950)

## 2022-09-13 ENCOUNTER — LAB (OUTPATIENT)
Dept: LAB | Facility: CLINIC | Age: 57
End: 2022-09-13
Payer: COMMERCIAL

## 2022-09-13 DIAGNOSIS — E78.00 PURE HYPERCHOLESTEROLEMIA: ICD-10-CM

## 2022-09-13 LAB
CHOLEST SERPL-MCNC: 196 MG/DL
HDLC SERPL-MCNC: 61 MG/DL
LDLC SERPL CALC-MCNC: 109 MG/DL
NONHDLC SERPL-MCNC: 135 MG/DL
TRIGL SERPL-MCNC: 129 MG/DL

## 2022-09-13 PROCEDURE — 36415 COLL VENOUS BLD VENIPUNCTURE: CPT

## 2022-09-13 PROCEDURE — 80061 LIPID PANEL: CPT

## 2022-09-25 ENCOUNTER — HEALTH MAINTENANCE LETTER (OUTPATIENT)
Age: 57
End: 2022-09-25

## 2023-01-04 ENCOUNTER — TRANSFERRED RECORDS (OUTPATIENT)
Dept: HEALTH INFORMATION MANAGEMENT | Facility: CLINIC | Age: 58
End: 2023-01-04

## 2023-04-26 ENCOUNTER — PATIENT OUTREACH (OUTPATIENT)
Dept: CARE COORDINATION | Facility: CLINIC | Age: 58
End: 2023-04-26
Payer: COMMERCIAL

## 2023-05-10 ENCOUNTER — PATIENT OUTREACH (OUTPATIENT)
Dept: CARE COORDINATION | Facility: CLINIC | Age: 58
End: 2023-05-10
Payer: COMMERCIAL

## 2023-08-06 ENCOUNTER — HEALTH MAINTENANCE LETTER (OUTPATIENT)
Age: 58
End: 2023-08-06

## 2023-08-06 ASSESSMENT — ENCOUNTER SYMPTOMS
FEVER: 0
MYALGIAS: 0
WEAKNESS: 0
JOINT SWELLING: 0
HEADACHES: 0
DIARRHEA: 1
PARESTHESIAS: 0
SHORTNESS OF BREATH: 0
FREQUENCY: 0
HEARTBURN: 1
SORE THROAT: 0
ABDOMINAL PAIN: 1
ARTHRALGIAS: 0
CHILLS: 0
HEMATOCHEZIA: 1
PALPITATIONS: 0
EYE PAIN: 0
HEMATURIA: 0
CONSTIPATION: 1
COUGH: 0
NERVOUS/ANXIOUS: 0
DIZZINESS: 0
NAUSEA: 1
DYSURIA: 0

## 2023-08-10 ENCOUNTER — OFFICE VISIT (OUTPATIENT)
Dept: INTERNAL MEDICINE | Facility: CLINIC | Age: 58
End: 2023-08-10
Payer: COMMERCIAL

## 2023-08-10 VITALS
HEART RATE: 73 BPM | RESPIRATION RATE: 16 BRPM | HEIGHT: 72 IN | DIASTOLIC BLOOD PRESSURE: 76 MMHG | TEMPERATURE: 98.3 F | WEIGHT: 220 LBS | SYSTOLIC BLOOD PRESSURE: 130 MMHG | OXYGEN SATURATION: 97 % | BODY MASS INDEX: 29.8 KG/M2

## 2023-08-10 DIAGNOSIS — E78.00 PURE HYPERCHOLESTEROLEMIA: ICD-10-CM

## 2023-08-10 DIAGNOSIS — Z00.00 ROUTINE GENERAL MEDICAL EXAMINATION AT A HEALTH CARE FACILITY: Primary | ICD-10-CM

## 2023-08-10 DIAGNOSIS — E66.09 CLASS 1 OBESITY DUE TO EXCESS CALORIES WITHOUT SERIOUS COMORBIDITY WITH BODY MASS INDEX (BMI) OF 30.0 TO 30.9 IN ADULT: ICD-10-CM

## 2023-08-10 DIAGNOSIS — Z13.1 SCREENING FOR DIABETES MELLITUS: ICD-10-CM

## 2023-08-10 DIAGNOSIS — K80.20 GALLSTONES: ICD-10-CM

## 2023-08-10 DIAGNOSIS — I65.23 CAROTID ATHEROSCLEROSIS, BILATERAL: ICD-10-CM

## 2023-08-10 DIAGNOSIS — Z80.0 FAMILY HISTORY OF COLON CANCER: ICD-10-CM

## 2023-08-10 DIAGNOSIS — Z12.5 SCREENING PSA (PROSTATE SPECIFIC ANTIGEN): ICD-10-CM

## 2023-08-10 DIAGNOSIS — E66.811 CLASS 1 OBESITY DUE TO EXCESS CALORIES WITHOUT SERIOUS COMORBIDITY WITH BODY MASS INDEX (BMI) OF 30.0 TO 30.9 IN ADULT: ICD-10-CM

## 2023-08-10 LAB
ALBUMIN SERPL BCG-MCNC: 4 G/DL (ref 3.5–5.2)
ALP SERPL-CCNC: 77 U/L (ref 40–129)
ALT SERPL W P-5'-P-CCNC: 44 U/L (ref 0–70)
ANION GAP SERPL CALCULATED.3IONS-SCNC: 9 MMOL/L (ref 7–15)
AST SERPL W P-5'-P-CCNC: 36 U/L (ref 0–45)
BILIRUB SERPL-MCNC: 0.7 MG/DL
BUN SERPL-MCNC: 15.2 MG/DL (ref 6–20)
CALCIUM SERPL-MCNC: 9.2 MG/DL (ref 8.6–10)
CHLORIDE SERPL-SCNC: 108 MMOL/L (ref 98–107)
CHOLEST SERPL-MCNC: 222 MG/DL
CREAT SERPL-MCNC: 1.27 MG/DL (ref 0.67–1.17)
DEPRECATED HCO3 PLAS-SCNC: 25 MMOL/L (ref 22–29)
ERYTHROCYTE [DISTWIDTH] IN BLOOD BY AUTOMATED COUNT: 13.2 % (ref 10–15)
GFR SERPL CREATININE-BSD FRML MDRD: 65 ML/MIN/1.73M2
GLUCOSE SERPL-MCNC: 90 MG/DL (ref 70–99)
HBA1C MFR BLD: 5.7 % (ref 0–5.6)
HCT VFR BLD AUTO: 48.1 % (ref 40–53)
HDLC SERPL-MCNC: 70 MG/DL
HGB BLD-MCNC: 16.6 G/DL (ref 13.3–17.7)
LDLC SERPL CALC-MCNC: 130 MG/DL
MCH RBC QN AUTO: 30.4 PG (ref 26.5–33)
MCHC RBC AUTO-ENTMCNC: 34.5 G/DL (ref 31.5–36.5)
MCV RBC AUTO: 88 FL (ref 78–100)
NONHDLC SERPL-MCNC: 152 MG/DL
PLATELET # BLD AUTO: 243 10E3/UL (ref 150–450)
POTASSIUM SERPL-SCNC: 4.3 MMOL/L (ref 3.4–5.3)
PROT SERPL-MCNC: 7.1 G/DL (ref 6.4–8.3)
PSA SERPL DL<=0.01 NG/ML-MCNC: 1.31 NG/ML (ref 0–3.5)
RBC # BLD AUTO: 5.46 10E6/UL (ref 4.4–5.9)
SODIUM SERPL-SCNC: 142 MMOL/L (ref 136–145)
TRIGL SERPL-MCNC: 112 MG/DL
TSH SERPL DL<=0.005 MIU/L-ACNC: 1.46 UIU/ML (ref 0.3–4.2)
WBC # BLD AUTO: 7.9 10E3/UL (ref 4–11)

## 2023-08-10 PROCEDURE — 80061 LIPID PANEL: CPT | Performed by: INTERNAL MEDICINE

## 2023-08-10 PROCEDURE — G0103 PSA SCREENING: HCPCS | Performed by: INTERNAL MEDICINE

## 2023-08-10 PROCEDURE — 84443 ASSAY THYROID STIM HORMONE: CPT | Performed by: INTERNAL MEDICINE

## 2023-08-10 PROCEDURE — 82306 VITAMIN D 25 HYDROXY: CPT | Performed by: INTERNAL MEDICINE

## 2023-08-10 PROCEDURE — 85027 COMPLETE CBC AUTOMATED: CPT | Performed by: INTERNAL MEDICINE

## 2023-08-10 PROCEDURE — 80053 COMPREHEN METABOLIC PANEL: CPT | Performed by: INTERNAL MEDICINE

## 2023-08-10 PROCEDURE — 83036 HEMOGLOBIN GLYCOSYLATED A1C: CPT | Performed by: INTERNAL MEDICINE

## 2023-08-10 PROCEDURE — 36415 COLL VENOUS BLD VENIPUNCTURE: CPT | Performed by: INTERNAL MEDICINE

## 2023-08-10 PROCEDURE — 99396 PREV VISIT EST AGE 40-64: CPT | Performed by: INTERNAL MEDICINE

## 2023-08-10 ASSESSMENT — ENCOUNTER SYMPTOMS
CHILLS: 0
NAUSEA: 1
DIZZINESS: 0
SORE THROAT: 0
HEARTBURN: 1
ARTHRALGIAS: 0
NERVOUS/ANXIOUS: 0
JOINT SWELLING: 0
DYSURIA: 0
MYALGIAS: 0
EYE PAIN: 0
SHORTNESS OF BREATH: 0
FEVER: 0
PARESTHESIAS: 0
HEMATURIA: 0
ABDOMINAL PAIN: 1
FREQUENCY: 0
HEMATOCHEZIA: 1
CONSTIPATION: 1
DIARRHEA: 1
WEAKNESS: 0
COUGH: 0
PALPITATIONS: 0
HEADACHES: 0

## 2023-08-10 NOTE — PROGRESS NOTES
SUBJECTIVE:   CC: Deion is an 58 year old who presents for preventative health visit.       8/10/2023     1:37 PM   Additional Questions   Roomed by Narda       Healthy Habits:     Getting at least 3 servings of Calcium per day:  Yes    Bi-annual eye exam:  NO    Dental care twice a year:  NO    Sleep apnea or symptoms of sleep apnea:  Sleep apnea    Diet:  Regular (no restrictions)    Frequency of exercise:  None    Taking medications regularly:  Yes    Medication side effects:  Other    Additional concerns today:  Yes      Today's PHQ-2 Score:       8/10/2023     1:35 PM   PHQ-2 ( 1999 Pfizer)   Q1: Little interest or pleasure in doing things 0   Q2: Feeling down, depressed or hopeless 0   PHQ-2 Score 0   Q1: Little interest or pleasure in doing things Not at all   Q2: Feeling down, depressed or hopeless Not at all   PHQ-2 Score 0       Social History     Tobacco Use    Smoking status: Never    Smokeless tobacco: Never   Substance Use Topics    Alcohol use: Yes     Comment: Alcoholic Drinks/day: rare             8/6/2023    11:24 AM   Alcohol Use   Prescreen: >3 drinks/day or >7 drinks/week? No       Last PSA:   Prostate Specific Antigen Screen   Date Value Ref Range Status   06/03/2022 1.05 0.00 - 3.50 ug/L Final       Reviewed orders with patient. Reviewed health maintenance and updated orders accordingly - Yes  Lab work is in process    Reviewed and updated as needed this visit by clinical staff   Tobacco  Allergies  Meds              Reviewed and updated as needed this visit by Provider     Meds                 Review of Systems   Constitutional:  Negative for chills and fever.   HENT:  Negative for congestion, ear pain, hearing loss and sore throat.    Eyes:  Negative for pain and visual disturbance.   Respiratory:  Negative for cough and shortness of breath.    Cardiovascular:  Negative for chest pain, palpitations and peripheral edema.   Gastrointestinal:  Positive for abdominal pain, constipation,  diarrhea, heartburn, hematochezia and nausea.   Genitourinary:  Negative for dysuria, frequency, genital sores, hematuria, impotence, penile discharge and urgency.   Musculoskeletal:  Negative for arthralgias, joint swelling and myalgias.   Skin:  Negative for rash.   Neurological:  Negative for dizziness, weakness, headaches and paresthesias.   Psychiatric/Behavioral:  Negative for mood changes. The patient is not nervous/anxious.        OBJECTIVE:   /76 (BP Location: Right arm, Patient Position: Sitting, Cuff Size: Adult Regular)   Pulse 73   Temp 98.3  F (36.8  C)   Resp 16   Ht 1.829 m (6')   Wt 99.8 kg (220 lb)   SpO2 97%   BMI 29.84 kg/m      Physical Exam    General: Alert, in no distress  Skin: No significant lesion seen.  Eyes/nose/throat: Eyes without scleral icterus, eye movements normal, pupils equal and reactive, oropharynx clear, ears with normal TM's  MSK: Neck with good ROM  Lymphatic: Neck without adenopathy or masses  Endocrine: Thyroid with no nodules to palpation  Pulm: Lungs clear to auscultation bilaterally  Cardiac: Heart with regular rate and rhythm, no murmur or gallop  GI: Abdomen soft, nontender. No palpable enlargement of liver or spleen  MSK: Extremities no tenderness or edema  Neuro: Moves all extremities, without focal weakness  Psych: Alert, normal mental status. 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  He knows he needs to lose some weight, and will be ramping up exercise.     Fasting blood tests today: at which time we will check his lipid panel, comprehensive metabolic panel, CBC, TSH, screening PSA, and also morning testosterone level and vitamin D level.    I have also ordered for him a recheck carotid ultrasound, to compare to 2019 to see if there have been any progression of atherosclerosis.  For the time being we will  continue on rosuvastatin 10 mg a day, although he may try an experiment of interrupting it for couple weeks (see details below).    Blood pressure probably okay, but encouraged him to get more measurements.    Surveillance colonoscopy is up-to-date as of January 2023 (details below)    Pure hypercholesterolemia, with troublesome side effects from atorvastatin and rosuvastatin, no significant coronary calcifications, but does have carotid atherosclerosis (detailed below)  Persists on low dose rosuvastatin 10 mg day.     Dr. Romano told me that he still is bothered by queasiness and nausea, which I think we can pretty confidently attribute to rosuvastatin, which she is taking at a lower dose of 10 mg daily as of August 2023.  We really do want to get aggressive with his lipids, and he is willing to persist through the side effects.    I told him he could try some other measures to try enhance the cholesterol lowering effects of the rosuvastatin, for example scoop of Metamucil per day, or serving of oatmeal (without milk or cream)    Initially atorvastatin, myalgias. Switched to rosuvastatin around 2010  Was on rosuvastatin 20 mg a day, on and off for most of [id number], dose reduced to 10 mg March 2021 until stopping early May 2022-- then restarted at 10 mg a day    Because of abdominal discomforts  Untreated  (5-2-2019)    3-  CORONARY ARTERY CALCIFICATION: Nothing significant.     Abdominal discomforts which thus far we have attributed to rosuvastatin side effect, but I agree with Dr. Gamez idea of doing an experiment of stopping and potentially rechallenging with rosuvastatin to be certain whether it is indeed a side effect phenomenon    Blood pressure, reading looks okay August 10, 2023, but I would encourage Dr. Romano to collect some more data meanwhile he will work on fitness, diet, and weight    Target 120/80  BP Readings from Last 6 Encounters:   08/10/23 130/76   05/26/22 (!) 154/96   03/01/21  116/76     History of TIA (transient ischemic attack) vs complex migraine, April 2019, there has been no recurrence  Spell of right upper extremity weakness  Likely complex migraine although TIA was a consideration  Continues on a baby aspirin daily     ALT elevation, mild  Alcohol weekend a few beers  6-3-2023  ALT 0 - 45 U/L 53 High      Carotid atherosclerotic plaque, but no history of cerebral ischemia  4-  RIGHT CAROTID: Mild atherosclerotic type luminal irregularity at the right carotid bifurcation and within the right carotid bulb with less than 30% stenosis in the proximal right ICA based on NASCET criteria.  LEFT CAROTID: Mild irregularity of the proximal-mid left ICA segment with at most 30-40% stenosis in the left ICA based on NASCET criteria. No findings to suggest acute dissection.  IMPRESSION:  HEAD MRI:   1.  No mass, hemorrhage, or recent infarct identified.  HEAD MRA:   1.  No aneurysm, high flow AVM or significant stenosis identified.  NECK MRA:  1.  Mild atherosclerotic change at the carotid bifurcations and within the proximal-mid left ICA segment. There is 30-40% stenosis of the left ICA and less than 30% stenosis of the proximal right ICA by NASCET criteria.  2.  No evidence for acute dissection.    Overweight, BMI 30--I agree with Dr. Romano's plan to try to get below 200 pounds  Wt Readings from Last 5 Encounters:   08/10/23 99.8 kg (220 lb)   05/26/22 100.8 kg (222 lb 4.8 oz)   03/01/21 100.8 kg (222 lb 3 oz)   05/02/19 98 kg (216 lb)   03/24/16 100 kg (220 lb 6.4 oz)     6-3-2023  Hemoglobin A1C 0.0 - 5.6 % 5.4     Positional sleep apnea  He is already undergone a sleep study, but does not need to use CPAP, and manages with sleeping position     History of advanced colonic polyps, next colonoscopy would be due December 2022  Family history of colon cancer (father)  Colonoscopy January 2023, unfortunately I do not have the report, but Dr. Romano recalls being told that concern for  smaller polyps removed, and he will be rechecked 2 years later which would be January 2025    colonoscopy was December 2021, 2 polyps, small less than 10 mm  Colonoscopy before that was December 2020, which revealed 1 large adenomatous polyp  Dr. Timothy Conti has recommended that Dr. Romano do annual surveillance colonoscopies, but leaves open the option of doing a segmental resection of the polyp prone piece of colon     Pulmonary nodule, resolved  EXAM: CT CHEST WO CONTRAST  DATE/TIME: 3/25/2021 7:32 AM  Follow up pulmonary nodule from 2019.  Prior right lower lobe nodular focus no longer seen. Few stable incidental pulmonary lymph nodes up to 5 mm requiring no routine follow-up. Incidental old granulomatous change and calcifications. Regions of minimal bronchiectasis    Gallstones  CT scan March 25, 2021  UPPER ABDOMEN: Cholelithiasis.     I reminded Dr. Romano to be on the look out for symptoms of biliary colic, which would be a dull boring kind of pain, usually epigastric or to the right upper quadrant, that would tend to occur after meals and persist at a steady level for couple hours after eating.  If such would recur, we should reinvestigate his gallbladder with imaging, and consider consultation with surgery to consider elective cholecystectomy    Slow transit constipation  Managed with fiber and MiraLAX  He believes rosuvastatin may have been a contributor as well     Screening PSA  6-3-2023  Prostate Specific Antigen Screen 0.00 - 3.50 ug/L 1.05     Testosterone Total 240 - 950 ng/dL 534     Free Testosterone Calculated ng/dL 9.15     History of a mild case of COVID-19 in April 2021    Immunization History   Administered Date(s) Administered    COVID-19 MONOVALENT 12+ (Pfizer) 12/30/2020, 01/19/2021    TDAP (Adacel,Boostrix) 10/17/2013    Td,adult,historic,unspecified 02/05/2003     COUNSELING:   Reviewed preventive health counseling, as reflected in patient instructions       Healthy  diet/nutrition      BMI:   Estimated body mass index is 29.84 kg/m  as calculated from the following:    Height as of this encounter: 1.829 m (6').    Weight as of this encounter: 99.8 kg (220 lb).   Weight management plan: Discussed healthy diet and exercise guidelines      He reports that he has never smoked. He has never used smokeless tobacco.      RANDA BROOKE MD  [facility]

## 2023-08-10 NOTE — PATIENT INSTRUCTIONS
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  He knows he needs to lose some weight, and will be ramping up exercise.     Fasting blood tests today: at which time we will check his lipid panel, comprehensive metabolic panel, CBC, TSH, screening PSA, and also morning testosterone level and vitamin D level.    I have also ordered for him a recheck carotid ultrasound, to compare to 2019 to see if there have been any progression of atherosclerosis.  For the time being we will continue on rosuvastatin 10 mg a day, although he may try an experiment of interrupting it for couple weeks (see details below).    Blood pressure probably okay, but encouraged him to get more measurements.    Surveillance colonoscopy is up-to-date as of January 2023 (details below)      Pure hypercholesterolemia, with troublesome side effects from atorvastatin and rosuvastatin, no significant coronary calcifications, but does have carotid atherosclerosis (detailed below)  Persists on low dose rosuvastatin 10 mg day.     Dr. Romano told me that he still is bothered by queasiness and nausea, which I think we can pretty confidently attribute to rosuvastatin, which she is taking at a lower dose of 10 mg daily as of August 2023.  We really do want to get aggressive with his lipids, and he is willing to persist through the side effects.    I told him he could try some other measures to try enhance the cholesterol lowering effects of the rosuvastatin, for example scoop of Metamucil per day, or serving of oatmeal (without milk or cream)    Initially atorvastatin, myalgias. Switched to rosuvastatin around 2010  Was on rosuvastatin 20 mg a day, on and off for most of [id number], dose reduced to 10 mg March 2021 until stopping early May 2022-- then restarted at 10 mg a day    Because of abdominal discomforts  Untreated  (5-2-2019)    3-  CORONARY ARTERY  CALCIFICATION: Nothing significant.     Abdominal discomforts which thus far we have attributed to rosuvastatin side effect, but I agree with Dr. Gamez idea of doing an experiment of stopping and potentially rechallenging with rosuvastatin to be certain whether it is indeed a side effect phenomenon    Blood pressure, reading looks okay August 10, 2023, but I would encourage Dr. Romano to collect some more data meanwhile he will work on fitness, diet, and weight    Target 120/80  BP Readings from Last 6 Encounters:   08/10/23 130/76   05/26/22 (!) 154/96   03/01/21 116/76     History of TIA (transient ischemic attack) vs complex migraine, April 2019, there has been no recurrence  Spell of right upper extremity weakness  Likely complex migraine although TIA was a consideration  Continues on a baby aspirin daily     ALT elevation, mild  Alcohol weekend a few beers  6-3-2023  ALT 0 - 45 U/L 53 High      Carotid atherosclerotic plaque, but no history of cerebral ischemia  4-  RIGHT CAROTID: Mild atherosclerotic type luminal irregularity at the right carotid bifurcation and within the right carotid bulb with less than 30% stenosis in the proximal right ICA based on NASCET criteria.  LEFT CAROTID: Mild irregularity of the proximal-mid left ICA segment with at most 30-40% stenosis in the left ICA based on NASCET criteria. No findings to suggest acute dissection.  IMPRESSION:  HEAD MRI:   1.  No mass, hemorrhage, or recent infarct identified.  HEAD MRA:   1.  No aneurysm, high flow AVM or significant stenosis identified.  NECK MRA:  1.  Mild atherosclerotic change at the carotid bifurcations and within the proximal-mid left ICA segment. There is 30-40% stenosis of the left ICA and less than 30% stenosis of the proximal right ICA by NASCET criteria.  2.  No evidence for acute dissection.    Overweight, BMI 30--I agree with Dr. Romano's plan to try to get below 200 pounds  Wt Readings from Last 5 Encounters:    08/10/23 99.8 kg (220 lb)   05/26/22 100.8 kg (222 lb 4.8 oz)   03/01/21 100.8 kg (222 lb 3 oz)   05/02/19 98 kg (216 lb)   03/24/16 100 kg (220 lb 6.4 oz)     6-3-2023  Hemoglobin A1C 0.0 - 5.6 % 5.4     Positional sleep apnea  He is already undergone a sleep study, but does not need to use CPAP, and manages with sleeping position     History of advanced colonic polyps, next colonoscopy would be due December 2022  Family history of colon cancer (father)  Colonoscopy January 2023, unfortunately I do not have the report, but Dr. Romano recalls being told that concern for smaller polyps removed, and he will be rechecked 2 years later which would be January 2025    colonoscopy was December 2021, 2 polyps, small less than 10 mm  Colonoscopy before that was December 2020, which revealed 1 large adenomatous polyp  Dr. Timothy Conti has recommended that Dr. Romano do annual surveillance colonoscopies, but leaves open the option of doing a segmental resection of the polyp prone piece of colon     Pulmonary nodule, resolved  EXAM: CT CHEST WO CONTRAST  DATE/TIME: 3/25/2021 7:32 AM  Follow up pulmonary nodule from 2019.  Prior right lower lobe nodular focus no longer seen. Few stable incidental pulmonary lymph nodes up to 5 mm requiring no routine follow-up. Incidental old granulomatous change and calcifications. Regions of minimal bronchiectasis    Gallstones  CT scan March 25, 2021  UPPER ABDOMEN: Cholelithiasis.    I reminded Dr. Romano to be on the look out for symptoms of biliary colic, which would be a dull boring kind of pain, usually epigastric or to the right upper quadrant, that would tend to occur after meals and persist at a steady level for couple hours after eating.  If such would recur, we should reinvestigate his gallbladder with imaging, and consider consultation with surgery to consider elective cholecystectomy    Slow transit constipation  Managed with fiber and MiraLAX  He believes rosuvastatin may have  been a contributor as well     Screening PSA  6-3-2023  Prostate Specific Antigen Screen 0.00 - 3.50 ug/L 1.05     Testosterone Total 240 - 950 ng/dL 534     Free Testosterone Calculated ng/dL 9.15     History of a mild case of COVID-19 in April 2021    Immunization History   Administered Date(s) Administered    COVID-19 MONOVALENT 12+ (Pfizer) 12/30/2020, 01/19/2021    TDAP (Adacel,Boostrix) 10/17/2013    Td,adult,historic,unspecified 02/05/2003

## 2023-08-11 LAB — DEPRECATED CALCIDIOL+CALCIFEROL SERPL-MC: 36 UG/L (ref 20–75)

## 2023-10-26 ENCOUNTER — PATIENT OUTREACH (OUTPATIENT)
Dept: GASTROENTEROLOGY | Facility: CLINIC | Age: 58
End: 2023-10-26
Payer: COMMERCIAL

## 2023-12-01 NOTE — PATIENT INSTRUCTIONS
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  He knows he needs to lose some weight, and will be ramping up exercise.    He will come in for fasting blood test on future morning, at which time we will check his lipid panel, comprehensive metabolic panel, CBC, TSH, screening PSA, and also morning testosterone level and vitamin D level.    He has received 2 doses of COVID-19 vaccine and also recovered from a breakthrough infection.    Issues are as follows:    Pure hypercholesterolemia, with troublesome side effects from atorvastatin and rosuvastatin, no significant coronary calcifications, but does have carotid atherosclerosis (detailed below)  Initially atorvastatin, myalgias. Switched to rosuvastatin around 2010  Was on rosuvastatin 20 mg a day, on and off for most of [id number], dose reduced to 10 mg March 2021 until stopping early May 2022  Because of abdominal discomforts  Untreated  (5-2-2019)    3-1-2021 while on rosuvastatin 20 mg  LDL Cholesterol Calculated <=129 mg/dL      3-  CORONARY ARTERY CALCIFICATION: Nothing significant.    Blood pressure elevation recorded today May 20, 2022, will recheck, and also asked him to track his blood pressure outside the clinic.  Target 120/80  BP Readings from Last 6 Encounters:   05/26/22 (!) 146/93   03/01/21 116/76     History of TIA (transient ischemic attack) vs complex migraine, April 2019, there has been no recurrence  Spell of right upper extremity weakness  Likely complex migraine although TIA was a consideration  Continues on a baby aspirin daily    AST and ALT elevation, which subsequently normalized  Alcohol weekend a few beers  3-1-2021  AST 0 - 40 U/L 53 High       ALT 0 - 45 U/L 82 High      6-  AST 0 - 40 U/L 28      ALT 0 - 45 U/L 37      Carotid atherosclerotic plaque, but no history of cerebral ischemia  4-  RIGHT CAROTID: Mild  Spray Paint Technique: No atherosclerotic type luminal irregularity at the right carotid bifurcation and within the right carotid bulb with less than 30% stenosis in the proximal right ICA based on NASCET criteria.  LEFT CAROTID: Mild irregularity of the proximal-mid left ICA segment with at most 30-40% stenosis in the left ICA based on NASCET criteria. No findings to suggest acute dissection.  IMPRESSION:  HEAD MRI:   1.  No mass, hemorrhage, or recent infarct identified.  HEAD MRA:   1.  No aneurysm, high flow AVM or significant stenosis identified.  NECK MRA:  1.  Mild atherosclerotic change at the carotid bifurcations and within the proximal-mid left ICA segment. There is 30-40% stenosis of the left ICA and less than 30% stenosis of the proximal right ICA by NASCET criteria.  2.  No evidence for acute dissection.    Overweight, BMI 30  Wt Readings from Last 5 Encounters:   05/26/22 100.8 kg (222 lb 4.8 oz)   03/01/21 100.8 kg (222 lb 3 oz)   05/02/19 98 kg (216 lb)   03/24/16 100 kg (220 lb 6.4 oz)   04/03/15 97.3 kg (214 lb 8 oz)     Positional sleep apnea  He is already undergone a sleep study, but does not need to use CPAP, and manages with sleeping position    History of advanced colonic polyps, next colonoscopy would be due December 2022  Family history of colon cancer (father)  Recent colonoscopy was December 2021, 2 polyps, small less than 10 mm  Colonoscopy before that was December 2020, which revealed 1 large adenomatous polyp  Dr. 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  Prior right lower lobe nodular focus no longer seen. Few stable incidental pulmonary lymph nodes up to 5 mm requiring no routine follow-up. Incidental old granulomatous change and calcifications. Regions  Medical Necessity Information: It is in your best interest to select a reason for this procedure from the list below. All of these items fulfill various CMS LCD requirements except the new and changing color options. of minimal bronchiectasis    Asymptomatic gallstones  CT scan March 25, 2021  UPPER ABDOMEN: Cholelithiasis.    Slow transit constipation  Managed with fiber and MiraLAX  He believes rosuvastatin may have been a contributor as well    Prostate Specific Antigen Screen   Date Value Ref Range Status   03/01/2021 1.1 0.0 - 3.5 ng/mL Final     History of a mild case of COVID-19 in April 2021  Immunization History   Administered Date(s) Administered    COVID-19,PF,Pfizer (12+ Yrs) 12/30/2020, 01/19/2021    Td,adult,historic,unspecified 02/05/2003    Tdap (Adacel,Boostrix) 10/17/2013        Medical Necessity Clause: This procedure was medically necessary because the lesions that were treated were: inflamed, irritated, enlarged Show Topical Anesthesia Variable?: Yes Detail Level: Detailed Post-Care Instructions: I reviewed with the patient in detail post-care instructions. Patient is to wear sunprotection, and avoid picking at any of the treated lesions. Pt may apply Vaseline to crusted or scabbing areas. Spray Paint Text: The liquid nitrogen was applied to the skin utilizing a spray paint frosting technique. Consent: The patient's consent was obtained including but not limited to risks of crusting, scabbing, blistering, scarring, darker or lighter pigmentary change, recurrence, incomplete removal and infection.

## 2024-07-11 ENCOUNTER — PATIENT OUTREACH (OUTPATIENT)
Dept: CARE COORDINATION | Facility: CLINIC | Age: 59
End: 2024-07-11
Payer: COMMERCIAL

## 2024-07-25 ENCOUNTER — PATIENT OUTREACH (OUTPATIENT)
Dept: CARE COORDINATION | Facility: CLINIC | Age: 59
End: 2024-07-25
Payer: COMMERCIAL

## 2024-09-28 ENCOUNTER — HEALTH MAINTENANCE LETTER (OUTPATIENT)
Age: 59
End: 2024-09-28

## 2024-11-04 ENCOUNTER — PATIENT OUTREACH (OUTPATIENT)
Dept: GASTROENTEROLOGY | Facility: CLINIC | Age: 59
End: 2024-11-04
Payer: COMMERCIAL

## 2024-11-04 DIAGNOSIS — Z12.11 SPECIAL SCREENING FOR MALIGNANT NEOPLASMS, COLON: Primary | ICD-10-CM

## 2024-11-04 NOTE — PROGRESS NOTES
Patient has a history of polyps and surveillance colonoscopy is due Jan 2025. Patient meets criteria for CRC high risk standing order protocol.    CRC Screening Colonoscopy Referral Review    Patient meets the inclusion criteria for screening colonoscopy standing order.    Ordering/Referring Provider:  Fausto Coronel MD    BMI: Estimated body mass index is 29.84 kg/m  as calculated from the following:    Height as of 8/10/23: 1.829 m (6').    Weight as of 8/10/23: 99.8 kg (220 lb).     Sedation:  Does patient have any of the following conditions affecting sedation?  No medical conditions affecting sedation.    Previous Scopes:  Any previous recommendations or follow up needs based on previous scope?  na / No recommendations.    Medical Concerns to Postpone Order:  Does patient have any of the following medical concerns that should postpone/delay colonoscopy referral?  No medical conditions affecting colonoscopy referral.    Final Referral Details:  Based on patient's medical history patient is appropriate for referral order with moderate sedation. If patient's BMI > 50 do not schedule in ASC.